# Patient Record
Sex: MALE | Race: WHITE | NOT HISPANIC OR LATINO | Employment: FULL TIME | RURAL
[De-identification: names, ages, dates, MRNs, and addresses within clinical notes are randomized per-mention and may not be internally consistent; named-entity substitution may affect disease eponyms.]

---

## 2017-11-30 RX ORDER — CITALOPRAM 40 MG/1
40 TABLET ORAL DAILY
Qty: 90 TABLET | Refills: 3 | Status: SHIPPED | OUTPATIENT
Start: 2017-11-30 | End: 2018-12-04 | Stop reason: SDUPTHER

## 2017-11-30 RX ORDER — LOSARTAN POTASSIUM 25 MG/1
25 TABLET ORAL DAILY
Qty: 90 TABLET | Refills: 3 | Status: SHIPPED | OUTPATIENT
Start: 2017-11-30 | End: 2018-10-23

## 2018-01-23 ENCOUNTER — OFFICE VISIT (OUTPATIENT)
Dept: CARDIOLOGY | Facility: CLINIC | Age: 58
End: 2018-01-23

## 2018-01-23 VITALS
SYSTOLIC BLOOD PRESSURE: 180 MMHG | HEART RATE: 78 BPM | BODY MASS INDEX: 30.79 KG/M2 | DIASTOLIC BLOOD PRESSURE: 84 MMHG | WEIGHT: 227 LBS

## 2018-01-23 DIAGNOSIS — I10 ESSENTIAL HYPERTENSION: Primary | ICD-10-CM

## 2018-01-23 DIAGNOSIS — R55 VASOVAGAL SYNCOPE: ICD-10-CM

## 2018-01-23 PROCEDURE — 99213 OFFICE O/P EST LOW 20 MIN: CPT | Performed by: INTERNAL MEDICINE

## 2018-01-23 RX ORDER — AMLODIPINE BESYLATE AND BENAZEPRIL HYDROCHLORIDE 5; 10 MG/1; MG/1
1 CAPSULE ORAL DAILY
Qty: 90 CAPSULE | Refills: 3 | Status: SHIPPED | OUTPATIENT
Start: 2018-01-23 | End: 2018-10-23 | Stop reason: SDUPTHER

## 2018-01-23 RX ORDER — VARENICLINE TARTRATE 0.5 MG/1
0.5 TABLET, FILM COATED ORAL DAILY
Qty: 90 TABLET | Refills: 1 | Status: SHIPPED | OUTPATIENT
Start: 2018-01-23 | End: 2018-10-23

## 2018-01-23 RX ORDER — VARENICLINE TARTRATE 1 MG/1
1 TABLET, FILM COATED ORAL DAILY
Qty: 60 TABLET | Refills: 0 | Status: SHIPPED | OUTPATIENT
Start: 2018-01-23 | End: 2018-10-23

## 2018-01-23 NOTE — PROGRESS NOTES
[facility] at [facility]  Office Progress Note  Chris Aguirre  1960  [phone]      Visit Date: 01/23/2018    PCP: Warren Patterson MD  [address] [place] [address]    IDENTIFICATION: A 57 y.o. male  [employer] employee    Chief Complaint   Patient presents with   • Follow-up   • Hypertension       PROBLEM LIST:   Syncope- cough  HL ? results  HTN  Nicotinism  Etoh abuse    Allergies  Allergies   Allergen Reactions   • Aspirin    • Codeine        Current Medications    Current Outpatient Prescriptions:   •  Cetirizine HCl (ZYRTEC ALLERGY PO), Take  by mouth daily., Disp: , Rfl:   •  citalopram (CeleXA) 40 MG tablet, Take 1 tablet by mouth Daily., Disp: 90 tablet, Rfl: 3  •  losartan (COZAAR) 25 MG tablet, Take 1 tablet by mouth Daily., Disp: 90 tablet, Rfl: 3  •  Mometasone Furo-Formoterol Fum (DULERA IN), Inhale 4 (four) times a day., Disp: , Rfl:   •  montelukast (SINGULAIR) 10 MG tablet, Take 10 mg by mouth every night., Disp: , Rfl:       History of Present Illness     Pt denies any new chest pain, dyspnea, dyspnea on exertion, orthopnea, PND, palpitations, lower extremity edema, or claudication.  He smokes 2ppd and drinks alcohol regularly.    ROS:  All systems have been reviewed and are negative with the exception of those mentioned in the HPI.    OBJECTIVE:  Vitals:    01/23/18 1312   BP: 180/84   BP Location: Left arm   Patient Position: Sitting   Pulse: 78   Weight: 103 kg (227 lb)     Physical Exam   Constitutional: He appears well-developed and well-nourished.   Neck: Normal range of motion. Neck supple. No hepatojugular reflux and no JVD present. Carotid bruit is not present. No tracheal deviation present. No thyromegaly present.   Cardiovascular: Normal rate, regular rhythm, S1 normal, S2 normal, intact distal pulses and normal pulses.  PMI is not displaced.  Exam reveals no gallop, no distant heart sounds, no friction rub, no midsystolic click and no  opening snap.    No murmur heard.  Pulses:       Radial pulses are 2+ on the right side, and 2+ on the left side.        Dorsalis pedis pulses are 2+ on the right side, and 2+ on the left side.        Posterior tibial pulses are 2+ on the right side, and 2+ on the left side.   Pulmonary/Chest: Effort normal and breath sounds normal. He has no wheezes. He has no rales.   Abdominal: Soft. Bowel sounds are normal. He exhibits no mass. There is no tenderness. There is no guarding.       Diagnostic Data:  Procedures      ASSESSMENT:   Diagnosis Plan   1. Essential hypertension     2. Vasovagal syncope         PLAN:  Htn uncontrolled etoh reduction, trial of lotrel 5/10. Dc cozaar    ?hl check lipids.  Given etoh use expect tgs to be elevated    Smoking cessation counseling        Warren Patterson MD, thank you for referring Mr. Aguirre for evaluation.  I have forwarded my electronically generated recommendations to you for review.  Please do not hesitate to call with any questions.      Dick Robledo MD, FACC

## 2018-02-13 ENCOUNTER — HOSPITAL ENCOUNTER (OUTPATIENT)
Age: 58
End: 2018-02-13
Payer: COMMERCIAL

## 2018-02-13 DIAGNOSIS — R07.81: Primary | ICD-10-CM

## 2018-02-13 PROCEDURE — 71101 X-RAY EXAM UNILAT RIBS/CHEST: CPT

## 2018-02-19 ENCOUNTER — TELEPHONE (OUTPATIENT)
Dept: CARDIOLOGY | Facility: CLINIC | Age: 58
End: 2018-02-19

## 2018-10-23 ENCOUNTER — OFFICE VISIT (OUTPATIENT)
Dept: CARDIOLOGY | Facility: CLINIC | Age: 58
End: 2018-10-23

## 2018-10-23 VITALS
DIASTOLIC BLOOD PRESSURE: 85 MMHG | SYSTOLIC BLOOD PRESSURE: 139 MMHG | WEIGHT: 215 LBS | HEIGHT: 72 IN | HEART RATE: 88 BPM | BODY MASS INDEX: 29.12 KG/M2

## 2018-10-23 DIAGNOSIS — E78.2 MIXED HYPERLIPIDEMIA: ICD-10-CM

## 2018-10-23 DIAGNOSIS — F17.200 SMOKER: ICD-10-CM

## 2018-10-23 DIAGNOSIS — I10 ESSENTIAL HYPERTENSION: Primary | ICD-10-CM

## 2018-10-23 PROCEDURE — 99213 OFFICE O/P EST LOW 20 MIN: CPT | Performed by: INTERNAL MEDICINE

## 2018-10-23 RX ORDER — AMLODIPINE BESYLATE AND BENAZEPRIL HYDROCHLORIDE 5; 10 MG/1; MG/1
1 CAPSULE ORAL DAILY
Qty: 90 CAPSULE | Refills: 3 | Status: SHIPPED | OUTPATIENT
Start: 2018-10-23 | End: 2019-11-12 | Stop reason: SDUPTHER

## 2018-10-23 NOTE — PROGRESS NOTES
"[facility] at [facility]  Office Progress Note  Chris Aguirre  1960  [phone]      Visit Date: 10/23/2018     PCP: Warren Patterson MD  [address] [facility] [address]    IDENTIFICATION: A 57 y.o. male  [employer] employee    Chief Complaint   Patient presents with   • Hypertension       PROBLEM LIST:   1. Syncope- cough  2. HL ? Results  1. 1/24/18  TG 59 HDL 60   3. HTN  4. Nicotinism-chantix refractory  5. Etoh abuse    Allergies  Allergies   Allergen Reactions   • Aspirin    • Codeine        Current Medications    Current Outpatient Prescriptions:   •  amLODIPine-benazepril (LOTREL) 5-10 MG per capsule, Take 1 capsule by mouth Daily., Disp: 90 capsule, Rfl: 3  •  Cetirizine HCl (ZYRTEC ALLERGY PO), Take  by mouth daily., Disp: , Rfl:   •  citalopram (CeleXA) 40 MG tablet, Take 1 tablet by mouth Daily., Disp: 90 tablet, Rfl: 3  •  Fluticasone Furoate-Vilanterol (BREO ELLIPTA) 100-25 MCG/INH aerosol powder , Inhale., Disp: , Rfl:   •  montelukast (SINGULAIR) 10 MG tablet, Take 10 mg by mouth every night., Disp: , Rfl:       History of Present Illness     Pt denies any new chest pain, dyspnea, dyspnea on exertion, orthopnea, PND, palpitations, lower extremity edema, or claudication.  He smokes 1- 2ppd and drinks alcohol regularly 6 beers nightly    ROS:  All systems have been reviewed and are negative with the exception of those mentioned in the HPI.    OBJECTIVE:  Vitals:    10/23/18 1355 10/23/18 1357   BP: 148/88 139/85   BP Location: Right arm Right arm   Patient Position: Sitting Sitting   Pulse: 88 88   Weight: 97.5 kg (215 lb)    Height: 182.9 cm (72\")      Physical Exam   Constitutional: He appears well-developed and well-nourished.   Neck: Normal range of motion. Neck supple. No hepatojugular reflux and no JVD present. Carotid bruit is not present. No tracheal deviation present. No thyromegaly present.   Cardiovascular: Normal rate, regular rhythm, " S1 normal, S2 normal, intact distal pulses and normal pulses.  PMI is not displaced.  Exam reveals no gallop, no distant heart sounds, no friction rub, no midsystolic click and no opening snap.    No murmur heard.  Pulses:       Radial pulses are 2+ on the right side, and 2+ on the left side.        Dorsalis pedis pulses are 2+ on the right side, and 2+ on the left side.        Posterior tibial pulses are 2+ on the right side, and 2+ on the left side.   Pulmonary/Chest: Effort normal and breath sounds normal. He has no wheezes. He has no rales.   Abdominal: Soft. Bowel sounds are normal. He exhibits no mass. There is no tenderness. There is no guarding.       Diagnostic Data:  Procedures      ASSESSMENT:   Diagnosis Plan   1. Essential hypertension     2. Mixed hyperlipidemia     3. Smoker         PLAN:  Htn uncontrolled etoh     ?hl check lipids.  Given etoh use expect tgs to be elevated    Smoking cessation counseling       Warren Patterson MD, thank you for referring Mr. Aguirre for evaluation.  I have forwarded my electronically generated recommendations to you for review.  Please do not hesitate to call with any questions.    . 10/23/2018  2:36 PM    Dick Robledo MD, [facility]

## 2018-11-16 ENCOUNTER — HOSPITAL ENCOUNTER (OUTPATIENT)
Age: 58
End: 2018-11-16
Payer: COMMERCIAL

## 2018-11-16 DIAGNOSIS — R59.9: Primary | ICD-10-CM

## 2018-11-16 PROCEDURE — 76536 US EXAM OF HEAD AND NECK: CPT

## 2018-12-04 RX ORDER — CITALOPRAM 40 MG/1
TABLET ORAL
Qty: 90 TABLET | Refills: 2 | Status: SHIPPED | OUTPATIENT
Start: 2018-12-04 | End: 2023-02-23

## 2019-01-07 RX ORDER — VARENICLINE TARTRATE 1 MG/1
1 TABLET, FILM COATED ORAL 2 TIMES DAILY
Qty: 60 TABLET | Refills: 2 | Status: SHIPPED | OUTPATIENT
Start: 2019-01-07 | End: 2019-11-14

## 2019-03-29 ENCOUNTER — HOSPITAL ENCOUNTER (OUTPATIENT)
Age: 59
End: 2019-03-29
Payer: COMMERCIAL

## 2019-03-29 DIAGNOSIS — S82.402A: Primary | ICD-10-CM

## 2019-03-29 PROCEDURE — 73610 X-RAY EXAM OF ANKLE: CPT

## 2019-04-19 ENCOUNTER — HOSPITAL ENCOUNTER (OUTPATIENT)
Age: 59
End: 2019-04-19
Payer: COMMERCIAL

## 2019-04-19 DIAGNOSIS — S82.402A: Primary | ICD-10-CM

## 2019-04-19 PROCEDURE — 73610 X-RAY EXAM OF ANKLE: CPT

## 2019-05-10 ENCOUNTER — HOSPITAL ENCOUNTER (OUTPATIENT)
Dept: HOSPITAL 22 - PT | Age: 59
Discharge: HOME | End: 2019-05-10
Payer: COMMERCIAL

## 2019-05-10 ENCOUNTER — HOSPITAL ENCOUNTER (OUTPATIENT)
Age: 59
End: 2019-05-10
Payer: COMMERCIAL

## 2019-05-10 DIAGNOSIS — S82.402A: Primary | ICD-10-CM

## 2019-05-10 DIAGNOSIS — S82.832A: Primary | ICD-10-CM

## 2019-05-10 PROCEDURE — 73610 X-RAY EXAM OF ANKLE: CPT

## 2019-05-10 PROCEDURE — 97760 ORTHOTIC MGMT&TRAING 1ST ENC: CPT

## 2019-06-10 ENCOUNTER — HOSPITAL ENCOUNTER (OUTPATIENT)
Age: 59
End: 2019-06-10
Payer: COMMERCIAL

## 2019-06-10 DIAGNOSIS — S82.402A: Primary | ICD-10-CM

## 2019-06-10 PROCEDURE — 73610 X-RAY EXAM OF ANKLE: CPT

## 2019-11-12 RX ORDER — AMLODIPINE BESYLATE AND BENAZEPRIL HYDROCHLORIDE 5; 10 MG/1; MG/1
CAPSULE ORAL
Qty: 90 CAPSULE | Refills: 0 | Status: SHIPPED | OUTPATIENT
Start: 2019-11-12 | End: 2020-02-19

## 2019-11-14 ENCOUNTER — OFFICE VISIT (OUTPATIENT)
Dept: CARDIOLOGY | Facility: CLINIC | Age: 59
End: 2019-11-14

## 2019-11-14 VITALS
OXYGEN SATURATION: 95 % | HEIGHT: 72 IN | WEIGHT: 215 LBS | DIASTOLIC BLOOD PRESSURE: 60 MMHG | HEART RATE: 78 BPM | SYSTOLIC BLOOD PRESSURE: 124 MMHG | BODY MASS INDEX: 29.12 KG/M2

## 2019-11-14 DIAGNOSIS — Z72.0 TOBACCO USE: ICD-10-CM

## 2019-11-14 DIAGNOSIS — E78.2 MIXED HYPERLIPIDEMIA: ICD-10-CM

## 2019-11-14 DIAGNOSIS — I10 ESSENTIAL HYPERTENSION: Primary | ICD-10-CM

## 2019-11-14 PROCEDURE — 93000 ELECTROCARDIOGRAM COMPLETE: CPT | Performed by: INTERNAL MEDICINE

## 2019-11-14 PROCEDURE — 99213 OFFICE O/P EST LOW 20 MIN: CPT | Performed by: INTERNAL MEDICINE

## 2019-11-14 PROCEDURE — 99406 BEHAV CHNG SMOKING 3-10 MIN: CPT | Performed by: INTERNAL MEDICINE

## 2019-11-14 NOTE — PROGRESS NOTES
"[facility] at [facility]  Office Progress Note  Chris Aguirre  1960      Visit Date: 11/14/19    PCP: Warren Patterson MD  [address]   [facility] [address]    IDENTIFICATION: A 58 y.o. male [employer] employee      PROBLEM LIST:   1. Syncope- cough  2. 2011 stress test per Dr. Hickman, wnl, idb  3. HL? Results  1. 1/24/18  TG 59 HDL 60   4. HTN  5. Nicotinism-chantix refractory  6. Etoh abuse    Chief Complaint   Patient presents with   • Hypertension       Allergies  Allergies   Allergen Reactions   • Aspirin    • Codeine        Current Medications    Current Outpatient Medications:   •  amLODIPine-benazepril (LOTREL 5-10) 5-10 MG per capsule, TAKE 1 CAPSULE DAILY, Disp: 90 capsule, Rfl: 0  •  Cetirizine HCl (ZYRTEC ALLERGY PO), Take  by mouth daily., Disp: , Rfl:   •  citalopram (CeleXA) 40 MG tablet, TAKE 1 TABLET BY MOUTH ONCE DAILY., Disp: 90 tablet, Rfl: 2  •  Fluticasone Furoate-Vilanterol (BREO ELLIPTA) 100-25 MCG/INH aerosol powder , Inhale., Disp: , Rfl:   •  montelukast (SINGULAIR) 10 MG tablet, Take 10 mg by mouth every night., Disp: , Rfl:       History of Present Illness   Chris Aguirre is a 58 y.o. year old male here for follow up.  Pt is still smoking 1.5 ppd cigarettes. Drinks 6 pack a day of beer. Not very active. He states he walks in the parking lot at work, but does not get any \"on purpose\" exercise. Does not check BP at home. Has not had labs in 2 years.  Pt denies any chest pain, dyspnea, dyspnea on exertion, orthopnea, PND, palpitations, lower extremity edema, or claudication.      OBJECTIVE:  Vitals:    11/14/19 1334   BP: 124/60   BP Location: Right arm   Patient Position: Sitting   Pulse: 78   SpO2: 95%   Weight: 97.5 kg (215 lb)   Height: 182.9 cm (72\")     Physical Exam   Constitutional: He is oriented to person, place, and time. He appears well-developed and well-nourished. No distress.   Smells of smoke   Cardiovascular: " Normal rate, regular rhythm, normal heart sounds and intact distal pulses.   No murmur heard.  Pulses:       Radial pulses are 2+ on the right side, and 2+ on the left side.        Dorsalis pedis pulses are 2+ on the right side, and 2+ on the left side.        Posterior tibial pulses are 2+ on the right side, and 2+ on the left side.   Pulmonary/Chest: Effort normal and breath sounds normal. He has no wheezes. He has no rales.   Abdominal: Soft. Bowel sounds are normal. There is no tenderness. There is no guarding.   Musculoskeletal: He exhibits no edema or tenderness.   Neurological: He is alert and oriented to person, place, and time.   Skin: Skin is warm and dry. No rash noted.   Psychiatric: He has a normal mood and affect.   Nursing note and vitals reviewed.      Diagnostic Data:    ECG 12 Lead  Date/Time: 11/14/2019 1:56 PM  Performed by: Dick Robledo MD  Authorized by: Dick Robledo MD   Comparison: not compared with previous ECG   Previous ECG: no previous ECG available  Rhythm: sinus rhythm  BPM: 67  Other findings: low voltage    Clinical impression: non-specific ECG              ASSESSMENT:   Diagnosis Plan   1. Essential hypertension     2. Mixed hyperlipidemia     3. Tobacco use         PLAN:  1. Patient has acceptable BP. Continue current medical management. Counseled to regularly check BP at home with goal averaging <130/80.   2. Will recheck labs today. Likely will need statin.  3. The patient has been counseled for 3-5 minutes on the many health detriments of cigarette smoking, methods to cessation, and benefits of cessation.      Warren Patterson MD, thank you for referring Mr. Aguirre for evaluation.  I have forwarded my electronically generated recommendations to you for review.  Please do not hesitate to call with any questions.    Scribed for Dick Robledo MD by Leeanne Ibrahim PA-C. 11/14/2019  1:57 PM   IDick MD, personally performed the services described in  this documentation as scribed by the above named individual in my presence, and it is both accurate and complete.  11/14/2019  2:08 PM    Dick Robledo MD, FACC

## 2020-02-19 RX ORDER — AMLODIPINE BESYLATE AND BENAZEPRIL HYDROCHLORIDE 5; 10 MG/1; MG/1
CAPSULE ORAL
Qty: 90 CAPSULE | Refills: 2 | Status: SHIPPED | OUTPATIENT
Start: 2020-02-19 | End: 2020-11-20

## 2020-06-22 NOTE — TELEPHONE ENCOUNTER
Called and left  labs looked good no changes at this time we will see him at next f/u appt.    Patient armband removed and shredded  . MyChart Activation    Thank you for requesting access to [employer]. Please follow the instructions below to securely access and download your online medical record. [employer]able allows you to send messages to your doctor, view your test results, renew your prescriptions, schedule appointments, and more. How Do I Sign Up? 1. In your internet browser, go to [email or web address]  2. Click on the First Time User? Click Here link in the Sign In box. You will be redirect to the New Member Sign Up page. 3. Enter your Smallable Access Code exactly as it appears below. You will not need to use this code after youve completed the sign-up process. If you do not sign up before the expiration date, you must request a new code. Smallable Access Code: [email or web address]  Expires: 2020  6:40 PM (This is the date your Smallable access code will )    4. Enter the last four digits of your Social Security Number (xxxx) and Date of Birth (mm/dd/yyyy) as indicated and click Submit. You will be taken to the next sign-up page. 5. Create a Smallable ID. This will be your Smallable login ID and cannot be changed, so think of one that is secure and easy to remember. 6. Create a Smallable password. You can change your password at any time. 7. Enter your Password Reset Question and Answer. This can be used at a later time if you forget your password. 8. Enter your e-mail address. You will receive e-mail notification when new information is available in [address]. 9. Click Sign Up. You can now view and download portions of your medical record. 10. Click the Download Summary menu link to download a portable copy of your medical information. Additional Information    If you have questions, please visit the Frequently Asked Questions section of the Smallable website at [email or web address] Remember, [employer]able is NOT to be used for urgent needs.  For medical emergencies, dial 911.      I have reviewed discharge instructions with the caregiver. The caregiver verbalized understanding. DISCHARGE SUMMARY from Nurse    PATIENT INSTRUCTIONS:    After general anesthesia or intravenous sedation, for 24 hours or while taking prescription Narcotics:  · Limit your activities  · Do not drive and operate hazardous machinery  · Do not make important personal or business decisions  · Do  not drink alcoholic beverages  · If you have not urinated within 8 hours after discharge, please contact your surgeon on call. Report the following to your surgeon:  · Excessive pain, swelling, redness or odor of or around the surgical area  · Temperature over 100.5  · Nausea and vomiting lasting longer than 4 hours or if unable to take medications  · Any signs of decreased circulation or nerve impairment to extremity: change in color, persistent  numbness, tingling, coldness or increase pain  · Any questions    What to do at Home:  Recommended activity: Activity as tolerated, ***    If you experience any of the following symptoms weakness, dizziness, shortness of breath,facial droop please follow up with Emergency Department or Provider ***. *  Please give a list of your current medications to your Primary Care Provider. *  Please update this list whenever your medications are discontinued, doses are      changed, or new medications (including over-the-counter products) are added. *  Please carry medication information at all times in case of emergency situations. These are general instructions for a healthy lifestyle:    No smoking/ No tobacco products/ Avoid exposure to second hand smoke  Surgeon General's Warning:  Quitting smoking now greatly reduces serious risk to your health.     Obesity, smoking, and sedentary lifestyle greatly increases your risk for illness    A healthy diet, regular physical exercise & weight monitoring are important for maintaining a healthy lifestyle    You may be retaining fluid if you have a history of heart failure or if you experience any of the following symptoms:  Weight gain of 3 pounds or more overnight or 5 pounds in a week, increased swelling in our hands or feet or shortness of breath while lying flat in bed. Please call your doctor as soon as you notice any of these symptoms; do not wait until your next office visit. The discharge information has been reviewed with the caregiver. The patient and caregiver verbalized understanding. Discharge medications reviewed with the caregiver and appropriate educational materials and side effects teaching were provided.   ___________________________________________________________________________________________________________________________________

## 2020-11-20 RX ORDER — AMLODIPINE BESYLATE AND BENAZEPRIL HYDROCHLORIDE 5; 10 MG/1; MG/1
CAPSULE ORAL
Qty: 90 CAPSULE | Refills: 3 | Status: SHIPPED | OUTPATIENT
Start: 2020-11-20 | End: 2021-11-23

## 2020-12-10 ENCOUNTER — OFFICE VISIT (OUTPATIENT)
Dept: CARDIOLOGY | Facility: CLINIC | Age: 60
End: 2020-12-10

## 2020-12-10 VITALS
DIASTOLIC BLOOD PRESSURE: 70 MMHG | BODY MASS INDEX: 28.85 KG/M2 | OXYGEN SATURATION: 93 % | HEIGHT: 72 IN | SYSTOLIC BLOOD PRESSURE: 130 MMHG | WEIGHT: 213 LBS | HEART RATE: 86 BPM

## 2020-12-10 DIAGNOSIS — E78.2 MIXED HYPERLIPIDEMIA: ICD-10-CM

## 2020-12-10 DIAGNOSIS — I10 ESSENTIAL HYPERTENSION: Primary | ICD-10-CM

## 2020-12-10 PROCEDURE — 93000 ELECTROCARDIOGRAM COMPLETE: CPT | Performed by: INTERNAL MEDICINE

## 2020-12-10 PROCEDURE — 99214 OFFICE O/P EST MOD 30 MIN: CPT | Performed by: INTERNAL MEDICINE

## 2020-12-10 NOTE — PROGRESS NOTES
"[facility] at [facility]  Office Progress Note  Chris Aguirre  1960      Visit Date: 12/10/20    PCP: Warren Patterson MD  [address]   [facility] [address]    IDENTIFICATION: A 60 y.o. male [employer] employee      PROBLEM LIST:   1. Syncope- cough  2. 2011 stress test per Dr. Hickman, wnl, idb  3. HL  1. 1/24/18  TG 59 HDL 60   4. HTN  5. Nicotinism-chantix refractory  6. Etoh abuse    Chief Complaint   Patient presents with   • Hypertension       Allergies  Allergies   Allergen Reactions   • Aspirin    • Codeine        Current Medications    Current Outpatient Medications:   •  amLODIPine-benazepril (LOTREL 5-10) 5-10 MG per capsule, TAKE 1 CAPSULE DAILY, Disp: 90 capsule, Rfl: 3  •  Cetirizine HCl (ZYRTEC ALLERGY PO), Take  by mouth daily., Disp: , Rfl:   •  citalopram (CeleXA) 40 MG tablet, TAKE 1 TABLET BY MOUTH ONCE DAILY., Disp: 90 tablet, Rfl: 2  •  Fluticasone Furoate-Vilanterol (BREO ELLIPTA) 100-25 MCG/INH aerosol powder , Inhale., Disp: , Rfl:   •  montelukast (SINGULAIR) 10 MG tablet, Take 10 mg by mouth every night., Disp: , Rfl:       History of Present Illness   Chris Aguirre is a 60 y.o. year old male here for follow up.  Continues to smoke but lower yield down to 1 pack of cigarettes daily.  He states he is attempting to stop smoking for his 8-year-old grandson    OBJECTIVE:  Vitals:    12/10/20 1129   BP: 130/70   BP Location: Right arm   Patient Position: Sitting   Pulse: 86   SpO2: 93%   Weight: 96.6 kg (213 lb)   Height: 182.9 cm (72\")     Physical Exam   Constitutional: He is oriented to person, place, and time. He appears well-developed and well-nourished. No distress.   Smells of smoke   Cardiovascular: Normal rate, regular rhythm, normal heart sounds and intact distal pulses.   No murmur heard.  Pulses:       Radial pulses are 2+ on the right side and 2+ on the left side.        Dorsalis pedis pulses are 2+ on the right side and 2+ " on the left side.        Posterior tibial pulses are 2+ on the right side and 2+ on the left side.   Pulmonary/Chest: Effort normal and breath sounds normal. He has no wheezes. He has no rales.   Abdominal: Soft. Bowel sounds are normal. There is no abdominal tenderness. There is no guarding.   Musculoskeletal:         General: No tenderness or edema.   Neurological: He is alert and oriented to person, place, and time.   Skin: Skin is warm and dry. No rash noted.   Psychiatric: He has a normal mood and affect.   Nursing note and vitals reviewed.      Diagnostic Data:    ECG 12 Lead    Date/Time: 12/10/2020 11:58 AM  Performed by: Dick Robledo MD  Authorized by: Dick Robledo MD   Previous ECG: no previous ECG available  BPM: 68    Clinical impression: non-specific ECG              ASSESSMENT:   Diagnosis Plan   1. Essential hypertension     2. Mixed hyperlipidemia         PLAN:  1. Patient has acceptable BP. Continue current medical management. Counseled to regularly check BP at home with goal averaging <130/80.   2. Will again attempt to recheck labs today. Likely will need statin.  3. The patient has been counseled for 3-5 minutes on the many health detriments of cigarette smoking, methods to cessation, and benefits of cessation.      Warren Patterson MD, thank you for referring Mr. Aguirre for evaluation.  I have forwarded my electronically generated recommendations to you for review.  Please do not hesitate to call with any questions.     12/10/2020  11:57 EST    Dick Robledo MD, [facility]

## 2021-07-20 ENCOUNTER — HOSPITAL ENCOUNTER (EMERGENCY)
Dept: HOSPITAL 22 - UTC | Age: 61
Discharge: HOME | End: 2021-07-20
Payer: COMMERCIAL

## 2021-07-20 VITALS — BODY MASS INDEX: 27.8 KG/M2

## 2021-07-20 VITALS
SYSTOLIC BLOOD PRESSURE: 126 MMHG | OXYGEN SATURATION: 98 % | HEART RATE: 74 BPM | RESPIRATION RATE: 20 BRPM | DIASTOLIC BLOOD PRESSURE: 56 MMHG

## 2021-07-20 VITALS
OXYGEN SATURATION: 98 % | RESPIRATION RATE: 16 BRPM | HEART RATE: 82 BPM | DIASTOLIC BLOOD PRESSURE: 84 MMHG | SYSTOLIC BLOOD PRESSURE: 146 MMHG

## 2021-07-20 VITALS
TEMPERATURE: 98.24 F | HEART RATE: 87 BPM | RESPIRATION RATE: 18 BRPM | SYSTOLIC BLOOD PRESSURE: 181 MMHG | DIASTOLIC BLOOD PRESSURE: 91 MMHG

## 2021-07-20 VITALS
DIASTOLIC BLOOD PRESSURE: 82 MMHG | RESPIRATION RATE: 19 BRPM | TEMPERATURE: 98.3 F | OXYGEN SATURATION: 98 % | HEART RATE: 91 BPM | SYSTOLIC BLOOD PRESSURE: 135 MMHG

## 2021-07-20 DIAGNOSIS — F17.210: ICD-10-CM

## 2021-07-20 DIAGNOSIS — I10: ICD-10-CM

## 2021-07-20 DIAGNOSIS — F41.8: ICD-10-CM

## 2021-07-20 DIAGNOSIS — K61.1: Primary | ICD-10-CM

## 2021-07-20 LAB
ANION GAP SERPL CALC-SCNC: 13.9 MEQ/L (ref 5–15)
ANION GAP SERPL CALC-SCNC: 20.9 MEQ/L (ref 5–15)
BUN SERPL-MCNC: 10 MG/DL (ref 9–20)
BUN SERPL-MCNC: 9 MG/DL (ref 9–20)
CALCIUM SPEC-MCNC: 9.1 MG/DL (ref 8.4–10.2)
CALCIUM SPEC-MCNC: 9.3 MG/DL (ref 8.4–10.2)
CELLS COUNTED: 100
CHLORIDE SPEC-SCNC: 100 MMOL/L (ref 98–107)
CHLORIDE SPEC-SCNC: 99 MMOL/L (ref 98–107)
CO2 SERPL-SCNC: 23 MMOL/L (ref 22–30)
CO2 SERPL-SCNC: 26 MMOL/L (ref 22–30)
CREAT BLD-SCNC: 0.6 MG/DL (ref 0.66–1.25)
CREAT BLD-SCNC: 0.7 MG/DL (ref 0.66–1.25)
CREATININE CLEARANCE ESTIMATED: 149 ML/MIN (ref 50–200)
CREATININE CLEARANCE ESTIMATED: 174 ML/MIN (ref 50–200)
ESTIMATED GLOMERULAR FILT RATE: 115 ML/MIN (ref 60–?)
ESTIMATED GLOMERULAR FILT RATE: 138 ML/MIN (ref 60–?)
GFR (AFRICAN AMERICAN): 140 ML/MIN (ref 60–?)
GFR (AFRICAN AMERICAN): 167 ML/MIN (ref 60–?)
GLUCOSE: 113 MG/DL (ref 74–100)
GLUCOSE: 115 MG/DL (ref 74–100)
HCT VFR BLD CALC: 44.9 % (ref 42–52)
HGB BLD-MCNC: 15.9 G/DL (ref 14.1–18)
HYPOCHROMIA BLD QL: (no result)
MACROCYTES BLD QL: (no result)
MANUAL DIFFERENTIAL: (no result)
MCHC RBC-ENTMCNC: 35.4 G/DL (ref 31.8–35.4)
MCV RBC: 95.9 FL (ref 80–94)
MEAN CORPUSCULAR HEMOGLOBIN: 34 PG (ref 27–31.2)
PLATELET # BLD: 282 K/MM3 (ref 142–424)
POTASSIUM: 10.9 MMOL/L (ref 3.5–5.1)
POTASSIUM: 3.9 MMOL/L (ref 3.5–5.1)
RBC # BLD AUTO: 4.68 M/MM3 (ref 4.6–6.2)
SODIUM SPEC-SCNC: 132 MMOL/L (ref 136–145)
SODIUM SPEC-SCNC: 136 MMOL/L (ref 136–145)
WBC # BLD AUTO: 18.2 K/MM3 (ref 4.8–10.8)

## 2021-07-20 PROCEDURE — 85025 COMPLETE CBC W/AUTO DIFF WBC: CPT

## 2021-07-20 PROCEDURE — 96365 THER/PROPH/DIAG IV INF INIT: CPT

## 2021-07-20 PROCEDURE — 99284 EMERGENCY DEPT VISIT MOD MDM: CPT

## 2021-07-20 PROCEDURE — 46050 I&D PERIANAL ABSCESS SUPFC: CPT

## 2021-07-20 PROCEDURE — 74177 CT ABD & PELVIS W/CONTRAST: CPT

## 2021-07-20 PROCEDURE — 36415 COLL VENOUS BLD VENIPUNCTURE: CPT

## 2021-07-20 PROCEDURE — 80048 BASIC METABOLIC PNL TOTAL CA: CPT

## 2021-07-20 PROCEDURE — 85007 BL SMEAR W/DIFF WBC COUNT: CPT

## 2021-07-20 NOTE — PC.NURSE
Additional blood drawn to rerun Potassium d/t initial critical result. Second specimen collected was reported by lab to be hemolyzed. Lab to redraw next specimen once pt finishes with CT.

## 2021-07-20 NOTE — PC.NURSE
PATIENT SENT TO ER PER DONN BARNETT FOR FURTHER EVALUATION. REPORT GIVEN BY DONN BARNETT TO SHANE PARSONS RN

## 2021-10-12 ENCOUNTER — HOSPITAL ENCOUNTER (OUTPATIENT)
Age: 61
End: 2021-10-12
Payer: COMMERCIAL

## 2021-10-12 DIAGNOSIS — Z12.11: ICD-10-CM

## 2021-10-12 DIAGNOSIS — Z01.812: Primary | ICD-10-CM

## 2021-10-12 DIAGNOSIS — Z11.52: ICD-10-CM

## 2021-10-12 PROCEDURE — U0005 INFEC AGEN DETEC AMPLI PROBE: HCPCS

## 2021-10-12 PROCEDURE — C9803 HOPD COVID-19 SPEC COLLECT: HCPCS

## 2021-10-12 PROCEDURE — U0003 INFECTIOUS AGENT DETECTION BY NUCLEIC ACID (DNA OR RNA); SEVERE ACUTE RESPIRATORY SYNDROME CORONAVIRUS 2 (SARS-COV-2) (CORONAVIRUS DISEASE [COVID-19]), AMPLIFIED PROBE TECHNIQUE, MAKING USE OF HIGH THROUGHPUT TECHNOLOGIES AS DESCRIBED BY CMS-2020-01-R: HCPCS

## 2021-10-14 ENCOUNTER — HOSPITAL ENCOUNTER (OUTPATIENT)
Dept: HOSPITAL 22 - OUTP | Age: 61
Discharge: HOME | End: 2021-10-14
Payer: COMMERCIAL

## 2021-10-14 VITALS
OXYGEN SATURATION: 99 % | RESPIRATION RATE: 18 BRPM | SYSTOLIC BLOOD PRESSURE: 131 MMHG | DIASTOLIC BLOOD PRESSURE: 72 MMHG | HEART RATE: 51 BPM

## 2021-10-14 VITALS
OXYGEN SATURATION: 97 % | RESPIRATION RATE: 18 BRPM | DIASTOLIC BLOOD PRESSURE: 74 MMHG | HEART RATE: 86 BPM | TEMPERATURE: 97.2 F | SYSTOLIC BLOOD PRESSURE: 135 MMHG

## 2021-10-14 VITALS
HEART RATE: 69 BPM | OXYGEN SATURATION: 97 % | RESPIRATION RATE: 18 BRPM | SYSTOLIC BLOOD PRESSURE: 121 MMHG | DIASTOLIC BLOOD PRESSURE: 69 MMHG | TEMPERATURE: 97.34 F

## 2021-10-14 VITALS
OXYGEN SATURATION: 98 % | RESPIRATION RATE: 18 BRPM | DIASTOLIC BLOOD PRESSURE: 73 MMHG | SYSTOLIC BLOOD PRESSURE: 125 MMHG | HEART RATE: 56 BPM

## 2021-10-14 VITALS
SYSTOLIC BLOOD PRESSURE: 135 MMHG | HEART RATE: 52 BPM | RESPIRATION RATE: 18 BRPM | OXYGEN SATURATION: 98 % | DIASTOLIC BLOOD PRESSURE: 72 MMHG

## 2021-10-14 VITALS — BODY MASS INDEX: 27.8 KG/M2

## 2021-10-14 VITALS — OXYGEN SATURATION: 97 %

## 2021-10-14 DIAGNOSIS — F32.9: ICD-10-CM

## 2021-10-14 DIAGNOSIS — Z72.0: ICD-10-CM

## 2021-10-14 DIAGNOSIS — K63.5: ICD-10-CM

## 2021-10-14 DIAGNOSIS — Z12.11: Primary | ICD-10-CM

## 2021-10-14 DIAGNOSIS — F41.9: ICD-10-CM

## 2021-10-14 DIAGNOSIS — K62.89: ICD-10-CM

## 2021-10-14 DIAGNOSIS — Z86.010: ICD-10-CM

## 2021-10-14 DIAGNOSIS — Z88.6: ICD-10-CM

## 2021-10-14 DIAGNOSIS — J44.9: ICD-10-CM

## 2021-10-14 DIAGNOSIS — K57.30: ICD-10-CM

## 2021-10-14 DIAGNOSIS — K64.0: ICD-10-CM

## 2021-10-14 DIAGNOSIS — I10: ICD-10-CM

## 2021-10-14 PROCEDURE — 0DJD8ZZ INSPECTION OF LOWER INTESTINAL TRACT, VIA NATURAL OR ARTIFICIAL OPENING ENDOSCOPIC: ICD-10-PCS | Performed by: SURGERY

## 2021-10-14 PROCEDURE — 45380 COLONOSCOPY AND BIOPSY: CPT

## 2021-10-14 PROCEDURE — 45385 COLONOSCOPY W/LESION REMOVAL: CPT

## 2021-11-23 RX ORDER — AMLODIPINE BESYLATE AND BENAZEPRIL HYDROCHLORIDE 5; 10 MG/1; MG/1
CAPSULE ORAL
Qty: 90 CAPSULE | Refills: 0 | Status: SHIPPED | OUTPATIENT
Start: 2021-11-23 | End: 2022-02-10 | Stop reason: SDUPTHER

## 2022-02-09 NOTE — PROGRESS NOTES
"[facility]  Office Progress Note  Chris Aguirre  1960 [address] [place] [address]       Visit Date: 02/10/22    PCP: Warren Patterson MD  [address] [place] [address]    IDENTIFICATION: A 61 y.o. male [employer] employee    PROBLEM LIST:   1. Syncope- cough  2. 2011 stress test per Dr. Hickman, wnl, idb  3. HL  1. 1/24/18  TG 59 HDL 60   4. HTN  5. Nicotinism-chantix refractory  6. Etoh abuse      CC:   Chief Complaint   Patient presents with   • Essential hypertension       Allergies  Allergies   Allergen Reactions   • Aspirin    • Codeine        Current Medications    Current Outpatient Medications:   •  amLODIPine-benazepril (LOTREL 5-10) 5-10 MG per capsule, TAKE 1 CAPSULE BY MOUTH DAILY., Disp: 90 capsule, Rfl: 0  •  Azelastine-Fluticasone 137-50 MCG/ACT suspension, INSTILL 1 SPRAY IN BOTH NOSTRILS TWICE A DAY, Disp: , Rfl:   •  Cetirizine HCl (ZYRTEC ALLERGY PO), Take  by mouth daily., Disp: , Rfl:   •  citalopram (CeleXA) 40 MG tablet, TAKE 1 TABLET BY MOUTH ONCE DAILY., Disp: 90 tablet, Rfl: 2  •  Fluticasone Furoate-Vilanterol (BREO ELLIPTA) 100-25 MCG/INH aerosol powder , Inhale., Disp: , Rfl:   •  montelukast (SINGULAIR) 10 MG tablet, Take 10 mg by mouth every night., Disp: , Rfl:       History of Present Illness   Chris Aguirre is a 61 y.o. year old male here for follow up.    Pt denies any chest pain, dyspnea, dyspnea on exertion, orthopnea, PND, palpitations, lower extremity edema, or claudication.  Smoking down to 3/4 pack a day.  States that his grandson is continuing to be on him about cessation.  Notable family members in his home with Covid but he did not test positive      OBJECTIVE:  Vitals:    02/10/22 1425   BP: 128/70   BP Location: Right arm   Patient Position: Sitting   Pulse: 84   SpO2: 94%   Weight: 97.1 kg (214 lb)   Height: 182.9 cm (72\")     Body mass index is 29.02 kg/m².    Constitutional:       " Appearance: Healthy appearance. Not in distress.   Neck:      Vascular: No JVR. JVD normal.   Pulmonary:      Effort: Pulmonary effort is normal.      Breath sounds: Normal breath sounds. No wheezing. No rhonchi. No rales.   Chest:      Chest wall: Not tender to palpatation.   Cardiovascular:      PMI at left midclavicular line. Normal rate. Regular rhythm. Normal S1. Normal S2.      Murmurs: There is no murmur.      No gallop. No click. No rub.   Pulses:     Intact distal pulses.   Edema:     Peripheral edema absent.   Abdominal:      General: Bowel sounds are normal.      Palpations: Abdomen is soft.      Tenderness: There is no abdominal tenderness.   Musculoskeletal: Normal range of motion.         General: No tenderness. Skin:     General: Skin is warm and dry.   Neurological:      General: No focal deficit present.      Mental Status: Alert and oriented to person, place and time.         Diagnostic Data:  Procedures      ASSESSMENT:   Diagnosis Plan   1. Primary hypertension     2. Mixed hyperlipidemia     3. Smoker         PLAN:  Hypertension controlled Lotrel refill    Mixed dyslipidemia we will document cardiac calcium score and low-dose CT chest    Okay cessation counseling greater than 10 minutes the office today and recommend cancer screening CT chest          Dick Robledo MD, FACC

## 2022-02-10 ENCOUNTER — OFFICE VISIT (OUTPATIENT)
Dept: CARDIOLOGY | Facility: CLINIC | Age: 62
End: 2022-02-10

## 2022-02-10 VITALS
BODY MASS INDEX: 28.99 KG/M2 | HEIGHT: 72 IN | SYSTOLIC BLOOD PRESSURE: 128 MMHG | HEART RATE: 84 BPM | OXYGEN SATURATION: 94 % | DIASTOLIC BLOOD PRESSURE: 70 MMHG | WEIGHT: 214 LBS

## 2022-02-10 DIAGNOSIS — E78.2 MIXED HYPERLIPIDEMIA: ICD-10-CM

## 2022-02-10 DIAGNOSIS — F17.200 SMOKER: ICD-10-CM

## 2022-02-10 DIAGNOSIS — I10 PRIMARY HYPERTENSION: Primary | ICD-10-CM

## 2022-02-10 PROCEDURE — 99214 OFFICE O/P EST MOD 30 MIN: CPT | Performed by: INTERNAL MEDICINE

## 2022-02-10 RX ORDER — AMLODIPINE BESYLATE AND BENAZEPRIL HYDROCHLORIDE 5; 10 MG/1; MG/1
1 CAPSULE ORAL DAILY
Qty: 90 CAPSULE | Refills: 0 | Status: SHIPPED | OUTPATIENT
Start: 2022-02-10 | End: 2022-05-24

## 2022-02-10 RX ORDER — AZELASTINE HYDROCHLORIDE, FLUTICASONE PROPIONATE 137; 50 UG/1; UG/1
SPRAY, METERED NASAL
COMMUNITY
Start: 2022-01-26

## 2022-02-24 ENCOUNTER — HOSPITAL ENCOUNTER (OUTPATIENT)
Dept: CT IMAGING | Facility: HOSPITAL | Age: 62
Discharge: HOME | End: 2022-02-24
Admitting: INTERNAL MEDICINE

## 2022-02-24 DIAGNOSIS — F17.200 SMOKER: ICD-10-CM

## 2022-02-24 PROCEDURE — 71271 CT THORAX LUNG CANCER SCR C-: CPT

## 2022-03-01 ENCOUNTER — TELEPHONE (OUTPATIENT)
Dept: CARDIOLOGY | Facility: CLINIC | Age: 62
End: 2022-03-01

## 2022-03-01 DIAGNOSIS — E78.2 MIXED HYPERLIPIDEMIA: Primary | ICD-10-CM

## 2022-03-01 RX ORDER — ROSUVASTATIN CALCIUM 20 MG/1
20 TABLET, COATED ORAL DAILY
Qty: 30 TABLET | Refills: 11 | Status: SHIPPED | OUTPATIENT
Start: 2022-03-01 | End: 2023-02-20 | Stop reason: SDUPTHER

## 2022-03-01 NOTE — TELEPHONE ENCOUNTER
----- Message from Dick Robledo MD sent at 2/28/2022  2:10 PM EST -----  Ct chest -  no lung Ca  Severe cor calcium- start crestor 20      Pt given results and advised to FU closely with PCP regarding lung disease. He is agreeable to Crestor 20, will mail orders to have labs checked in 6-8 weeks. Letter sent to PCP and pt.

## 2022-04-22 RX ORDER — CITALOPRAM 20 MG/1
TABLET ORAL
Qty: 30 TABLET | Refills: 0 | Status: SHIPPED | OUTPATIENT
Start: 2022-04-22 | End: 2022-06-28

## 2022-05-24 RX ORDER — AMLODIPINE BESYLATE AND BENAZEPRIL HYDROCHLORIDE 5; 10 MG/1; MG/1
1 CAPSULE ORAL DAILY
Qty: 90 CAPSULE | Refills: 0 | Status: SHIPPED | OUTPATIENT
Start: 2022-05-24 | End: 2022-11-21

## 2022-06-28 ENCOUNTER — TELEPHONE (OUTPATIENT)
Dept: FAMILY MEDICINE CLINIC | Facility: CLINIC | Age: 62
End: 2022-06-28

## 2022-06-28 RX ORDER — CITALOPRAM 20 MG/1
TABLET ORAL
Qty: 10 TABLET | Refills: 0 | Status: SHIPPED | OUTPATIENT
Start: 2022-06-28

## 2022-06-28 NOTE — TELEPHONE ENCOUNTER
Please call, does he have any celexa left? Looks like he has not been seen officially in this office yet. Dr Scott back next week. Does he have enough til then? I can give enough til Dr Scott is back if needed. Let me know.

## 2022-11-19 DIAGNOSIS — E78.2 MIXED HYPERLIPIDEMIA: Primary | ICD-10-CM

## 2022-11-19 DIAGNOSIS — I10 PRIMARY HYPERTENSION: ICD-10-CM

## 2022-11-21 RX ORDER — AMLODIPINE BESYLATE AND BENAZEPRIL HYDROCHLORIDE 5; 10 MG/1; MG/1
1 CAPSULE ORAL DAILY
Qty: 90 CAPSULE | Refills: 0 | Status: SHIPPED | OUTPATIENT
Start: 2022-11-21 | End: 2023-02-20 | Stop reason: SDUPTHER

## 2022-11-21 NOTE — TELEPHONE ENCOUNTER
Need labs at next appt for further refills  Spoke with pt and he has not had labs in the last year. Will mail lab orders

## 2023-02-20 DIAGNOSIS — E78.2 MIXED HYPERLIPIDEMIA: ICD-10-CM

## 2023-02-20 RX ORDER — AMLODIPINE BESYLATE AND BENAZEPRIL HYDROCHLORIDE 5; 10 MG/1; MG/1
1 CAPSULE ORAL DAILY
Qty: 90 CAPSULE | Refills: 0 | Status: SHIPPED | OUTPATIENT
Start: 2023-02-20

## 2023-02-20 RX ORDER — ROSUVASTATIN CALCIUM 20 MG/1
20 TABLET, COATED ORAL DAILY
Qty: 90 TABLET | Refills: 0 | Status: SHIPPED | OUTPATIENT
Start: 2023-02-20 | End: 2023-02-23 | Stop reason: SDUPTHER

## 2023-02-23 ENCOUNTER — OFFICE VISIT (OUTPATIENT)
Dept: CARDIOLOGY | Facility: CLINIC | Age: 63
End: 2023-02-23
Payer: COMMERCIAL

## 2023-02-23 VITALS
HEIGHT: 72 IN | DIASTOLIC BLOOD PRESSURE: 68 MMHG | SYSTOLIC BLOOD PRESSURE: 130 MMHG | WEIGHT: 208 LBS | HEART RATE: 83 BPM | BODY MASS INDEX: 28.17 KG/M2 | OXYGEN SATURATION: 97 %

## 2023-02-23 DIAGNOSIS — E78.2 MIXED HYPERLIPIDEMIA: ICD-10-CM

## 2023-02-23 PROCEDURE — 99214 OFFICE O/P EST MOD 30 MIN: CPT | Performed by: INTERNAL MEDICINE

## 2023-02-23 RX ORDER — ROSUVASTATIN CALCIUM 20 MG/1
20 TABLET, COATED ORAL DAILY
Qty: 90 TABLET | Refills: 0 | Status: SHIPPED | OUTPATIENT
Start: 2023-02-23 | End: 2023-02-27 | Stop reason: SDUPTHER

## 2023-02-23 RX ORDER — VARENICLINE TARTRATE 0.5 MG/1
TABLET, FILM COATED ORAL
COMMUNITY
Start: 2023-02-21

## 2023-02-23 NOTE — PROGRESS NOTES
[facility] Cardiology  Office Progress Note  Chris Aguirre  1960 [address] [facility] [place] [address]       Visit Date: 02/23/23    PCP: Siva Darling MD  [address]   [facility] [place] [address]    IDENTIFICATION: A 62 y.o. male Formerly [employer] employee    PROBLEM LIST:   1. Syncope- cough  2. CAD  2011 stress test per Dr. Hickman, wnl, idb  2022 CT chest severe cor calcifications  3. HL  1. 1/24/18  TG 59 HDL 60   4. HTN  5. Nicotinism-chantix refractory  2/22 CT chest severe emphysema 5mm noncalcified nodule  6. Etoh abuse      CC:   Chief Complaint   Patient presents with   • Primary hypertension       Allergies  Allergies   Allergen Reactions   • Aspirin    • Codeine        Current Medications    Current Outpatient Medications:   •  amLODIPine-benazepril (LOTREL 5-10) 5-10 MG per capsule, Take 1 capsule by mouth Daily., Disp: 90 capsule, Rfl: 0  •  Azelastine-Fluticasone 137-50 MCG/ACT suspension, INSTILL 1 SPRAY IN BOTH NOSTRILS TWICE A DAY, Disp: , Rfl:   •  Cetirizine HCl (ZYRTEC ALLERGY PO), Take  by mouth daily., Disp: , Rfl:   •  citalopram (CeleXA) 20 MG tablet, TAKE 1 TABLET BY MOUTH ONCE DAILY., Disp: 10 tablet, Rfl: 0  •  Fluticasone Furoate-Vilanterol (BREO ELLIPTA) 100-25 MCG/INH inhaler, Inhale., Disp: , Rfl:   •  montelukast (SINGULAIR) 10 MG tablet, Take 10 mg by mouth every night., Disp: , Rfl:   •  rosuvastatin (CRESTOR) 20 MG tablet, Take 1 tablet by mouth Daily. [facility], Disp: 90 tablet, Rfl: 0  •  varenicline (CHANTIX) 0.5 MG tablet, STEP 1: DAYS 1-3 TAKE 1 TABLET ONCE DAILY. DAYS 4-7 TAKE 1 TABLET TWICE DAILY., Disp: , Rfl:       History of Present Illness   Chris Aguirre is a 62 y.o. year old male here for follow up.  Is willing to again attempt smoking cessation with the use of Chantix.  He is scheduled to go to [facility] later this spring for work visit        OBJECTIVE:  Vitals:    02/23/23 1536   BP:  "130/68   BP Location: Right arm   Patient Position: Sitting   Pulse: 83   SpO2: 97%   Weight: 94.3 kg (208 lb)   Height: 182.9 cm (72.01\")     Body mass index is 28.2 kg/m².    Constitutional:       Appearance: Healthy appearance. Not in distress.   Neck:      Vascular: No JVR. JVD normal.   Pulmonary:      Effort: Pulmonary effort is normal.      Breath sounds: Normal breath sounds. No wheezing. No rhonchi. No rales.      Comments: Diminished diffusely  Chest:      Chest wall: Not tender to palpatation.   Cardiovascular:      PMI at left midclavicular line. Normal rate. Regular rhythm. Normal S1. Normal S2.      Murmurs: There is no murmur.      No gallop. No click. No rub.   Pulses:     Intact distal pulses.   Edema:     Peripheral edema absent.   Abdominal:      General: Bowel sounds are normal.      Palpations: Abdomen is soft.      Tenderness: There is no abdominal tenderness.   Musculoskeletal: Normal range of motion.         General: No tenderness. Skin:     General: Skin is warm and dry.   Neurological:      General: No focal deficit present.      Mental Status: Alert and oriented to person, place and time.         Diagnostic Data:  Procedures      ASSESSMENT:   Diagnosis Plan   1. Mixed hyperlipidemia  rosuvastatin (CRESTOR) 20 MG tablet    Lipid Panel          PLAN:  Hypertension controlled Lotrel refill    Mixed dyslipidemia follow-up lipid profile on rosuvastatin      Smoking cessation counseling greater than 10 minutes the office today and recommend cancer screening CT chest          Dick Robledo MD, FACC  "

## 2023-02-24 LAB
CHOLEST SERPL-MCNC: 134 MG/DL (ref 100–199)
HDLC SERPL-MCNC: 73 MG/DL
LDLC SERPL CALC-MCNC: 50 MG/DL (ref 0–99)
TRIGL SERPL-MCNC: 48 MG/DL (ref 0–149)
VLDLC SERPL CALC-MCNC: 11 MG/DL (ref 5–40)

## 2023-02-27 DIAGNOSIS — E78.2 MIXED HYPERLIPIDEMIA: ICD-10-CM

## 2023-02-27 RX ORDER — ROSUVASTATIN CALCIUM 20 MG/1
20 TABLET, COATED ORAL DAILY
Qty: 90 TABLET | Refills: 3 | Status: SHIPPED | OUTPATIENT
Start: 2023-02-27

## 2023-05-17 RX ORDER — AMLODIPINE BESYLATE AND BENAZEPRIL HYDROCHLORIDE 5; 10 MG/1; MG/1
1 CAPSULE ORAL DAILY
Qty: 90 CAPSULE | Refills: 2 | Status: SHIPPED | OUTPATIENT
Start: 2023-05-17

## 2024-02-21 RX ORDER — AMLODIPINE BESYLATE AND BENAZEPRIL HYDROCHLORIDE 5; 10 MG/1; MG/1
1 CAPSULE ORAL DAILY
Qty: 90 CAPSULE | Refills: 1 | Status: SHIPPED | OUTPATIENT
Start: 2024-02-21

## 2024-03-28 ENCOUNTER — OFFICE VISIT (OUTPATIENT)
Dept: CARDIOLOGY | Facility: CLINIC | Age: 64
End: 2024-03-28
Payer: COMMERCIAL

## 2024-03-28 VITALS
SYSTOLIC BLOOD PRESSURE: 126 MMHG | OXYGEN SATURATION: 94 % | BODY MASS INDEX: 27.9 KG/M2 | WEIGHT: 206 LBS | HEIGHT: 72 IN | HEART RATE: 82 BPM | DIASTOLIC BLOOD PRESSURE: 70 MMHG

## 2024-03-28 DIAGNOSIS — I10 PRIMARY HYPERTENSION: Primary | ICD-10-CM

## 2024-03-28 DIAGNOSIS — F17.200 SMOKER: ICD-10-CM

## 2024-03-28 DIAGNOSIS — E78.2 MIXED HYPERLIPIDEMIA: ICD-10-CM

## 2024-03-28 PROCEDURE — 99214 OFFICE O/P EST MOD 30 MIN: CPT | Performed by: INTERNAL MEDICINE

## 2024-03-28 RX ORDER — FLUTICASONE FUROATE AND VILANTEROL TRIFENATATE 200; 25 UG/1; UG/1
POWDER RESPIRATORY (INHALATION)
COMMUNITY
Start: 2024-01-08

## 2024-03-28 NOTE — PROGRESS NOTES
[facility] Cardiology  Office Progress Note  Chris Aguirre  1960 [address] [facility] [place] [address]       Visit Date: 03/28/24    PCP: Siva Darling MD  [address]   [facility] [place] [address]    IDENTIFICATION: A 63 [facility]. [facility] [place]    PROBLEM LIST:   Syncope- cough  CAD-aspirin anaphylactoid allergy  2011 stress test per Dr. Hickman, wnl, idb  2022 CT chest severe cor calcifications  HL  1/24/18  TG 59 HDL 60   2023 ldl 50  HTN  Nicotinism-chantix refractory  2/22 CT chest severe emphysema 5mm noncalcified nodule  Etoh abuse      CC:   Chief Complaint   Patient presents with    Hypertension       Allergies  Allergies   Allergen Reactions    Aspirin     Codeine        Current Medications    Current Outpatient Medications:     amLODIPine-benazepril (LOTREL 5-10) 5-10 MG per capsule, Take 1 capsule by mouth Daily., Disp: 90 capsule, Rfl: 1    Azelastine-Fluticasone 137-50 MCG/ACT suspension, INSTILL 1 SPRAY IN BOTH NOSTRILS TWICE A DAY, Disp: , Rfl:     Breo Ellipta 200-25 MCG/ACT inhaler, INHALE 1 PUFF BY MOUTH ONCE A DAY RINSE MOUTH AFTER USE, Disp: , Rfl:     Cetirizine HCl (ZYRTEC ALLERGY PO), Take  by mouth daily., Disp: , Rfl:     citalopram (CeleXA) 20 MG tablet, TAKE 1 TABLET BY MOUTH ONCE DAILY., Disp: 10 tablet, Rfl: 0    montelukast (SINGULAIR) 10 MG tablet, Take 1 tablet by mouth Every Night., Disp: , Rfl:     rosuvastatin (CRESTOR) 20 MG tablet, Take 1 tablet by mouth Daily. [facility], Disp: 90 tablet, Rfl: 3    varenicline (CHANTIX) 0.5 MG tablet, STEP 1: DAYS 1-3 TAKE 1 TABLET ONCE DAILY. DAYS 4-7 TAKE 1 TABLET TWICE DAILY., Disp: , Rfl:       History of Present Illness   Chris Aguirre is a 63 y.o. year old male here for follow up.  Lost his father this past year states he was having difficulty mentally and was not able to discontinue smoking.  He has been on only approximately 2 beers daily.  He states he  "has no chest symptom      OBJECTIVE:  Vitals:    03/28/24 1435   Pulse: 82   SpO2: 94%   Weight: 93.4 kg (206 lb)   Height: 182.9 cm (72\")       Body mass index is 27.94 kg/m².    Constitutional:       Appearance: Healthy appearance. Not in distress.   Neck:      Vascular: No JVR. JVD normal.   Pulmonary:      Effort: Pulmonary effort is normal.      Breath sounds: Normal breath sounds. No wheezing. No rhonchi. No rales.      Comments: Diminished diffusely  Chest:      Chest wall: Not tender to palpatation.   Cardiovascular:      PMI at left midclavicular line. Normal rate. Regular rhythm. Normal S1. Normal S2.       Murmurs: There is no murmur.      No gallop.  No click. No rub.   Pulses:     Intact distal pulses.   Edema:     Peripheral edema absent.   Abdominal:      General: Bowel sounds are normal.      Palpations: Abdomen is soft.      Tenderness: There is no abdominal tenderness.   Musculoskeletal: Normal range of motion.         General: No tenderness. Skin:     General: Skin is warm and dry.   Neurological:      General: No focal deficit present.      Mental Status: Alert and oriented to person, place and time.         Diagnostic Data:  Procedures      ASSESSMENT:   Diagnosis Plan   1. Primary hypertension        2. Mixed hyperlipidemia        3. Smoker              PLAN:  Hypertension controlled Lotrel refill    Mixed dyslipidemia f controlled on rosuvastatin      Smoking cessation counseling greater than 10 minutes the office today and recommend cancer screening CT chest          Dick Robledo MD, FACC  "

## 2024-05-24 DIAGNOSIS — E78.2 MIXED HYPERLIPIDEMIA: ICD-10-CM

## 2024-05-24 RX ORDER — ROSUVASTATIN CALCIUM 20 MG/1
20 TABLET, COATED ORAL DAILY
Qty: 30 TABLET | Refills: 0 | Status: SHIPPED | OUTPATIENT
Start: 2024-05-24

## 2024-08-21 DIAGNOSIS — E78.2 MIXED HYPERLIPIDEMIA: ICD-10-CM

## 2024-08-22 RX ORDER — AMLODIPINE BESYLATE AND BENAZEPRIL HYDROCHLORIDE 5; 10 MG/1; MG/1
1 CAPSULE ORAL DAILY
Qty: 90 CAPSULE | Refills: 1 | Status: SHIPPED | OUTPATIENT
Start: 2024-08-22

## 2024-08-22 RX ORDER — ROSUVASTATIN CALCIUM 20 MG/1
20 TABLET, COATED ORAL DAILY
Qty: 30 TABLET | Refills: 0 | Status: SHIPPED | OUTPATIENT
Start: 2024-08-22

## 2025-05-08 ENCOUNTER — OFFICE VISIT (OUTPATIENT)
Dept: CARDIOLOGY | Facility: CLINIC | Age: 65
End: 2025-05-08
Payer: COMMERCIAL

## 2025-05-08 VITALS
DIASTOLIC BLOOD PRESSURE: 70 MMHG | BODY MASS INDEX: 27.63 KG/M2 | HEART RATE: 83 BPM | OXYGEN SATURATION: 96 % | HEIGHT: 72 IN | SYSTOLIC BLOOD PRESSURE: 138 MMHG | WEIGHT: 204 LBS

## 2025-05-08 DIAGNOSIS — E78.2 MIXED HYPERLIPIDEMIA: ICD-10-CM

## 2025-05-08 DIAGNOSIS — F17.200 SMOKER: ICD-10-CM

## 2025-05-08 DIAGNOSIS — I25.10 CORONARY ARTERY DISEASE INVOLVING NATIVE CORONARY ARTERY OF NATIVE HEART WITHOUT ANGINA PECTORIS: ICD-10-CM

## 2025-05-08 DIAGNOSIS — I10 PRIMARY HYPERTENSION: Primary | ICD-10-CM

## 2025-05-08 PROCEDURE — 99214 OFFICE O/P EST MOD 30 MIN: CPT | Performed by: INTERNAL MEDICINE

## 2025-05-08 RX ORDER — ROSUVASTATIN CALCIUM 20 MG/1
20 TABLET, COATED ORAL DAILY
Qty: 90 TABLET | Refills: 3 | Status: SHIPPED | OUTPATIENT
Start: 2025-05-08

## 2025-05-08 NOTE — PROGRESS NOTES
"[facility] Cardiology  Office Progress Note  Chris Aguirre  1960 [address] [facility] [place] [address]       Visit Date: 05/08/25    PCP: Siva Darling MD  [address]   [facility] [address]    IDENTIFICATION: [facility] 64 [facility]. [facility]    PROBLEM LIST:   Syncope- cough  CAD-aspirin anaphylactoid allergy  2011 stress test per Dr. Hickman, wnl, idb  2022 CT chest severe cor calcifications  HL  1/24/18  TG 59 HDL 60   2023 ldl 50  HTN  Nicotinism-chantix refractory  2/22 CT chest severe emphysema 5mm noncalcified nodule  Etoh abuse      CC:   Chief Complaint   Patient presents with    Hypertension       Allergies  Allergies   Allergen Reactions    Aspirin     Codeine        Current Medications    Current Outpatient Medications:     amLODIPine-benazepril (LOTREL 5-10) 5-10 MG per capsule, Take 1 capsule by mouth Daily. NEEDS UPDATED LAB WORK PLS FOR FUTURE REFILLS, Disp: 90 capsule, Rfl: 1    Breo Ellipta 200-25 MCG/ACT inhaler, INHALE 1 PUFF BY MOUTH ONCE A DAY RINSE MOUTH AFTER USE, Disp: , Rfl:     Cetirizine HCl (ZYRTEC ALLERGY PO), Take  by mouth daily., Disp: , Rfl:     citalopram (CeleXA) 20 MG tablet, TAKE 1 TABLET BY MOUTH ONCE DAILY., Disp: 10 tablet, Rfl: 0    montelukast (SINGULAIR) 10 MG tablet, Take 1 tablet by mouth Every Night., Disp: , Rfl:       History of Present Illness   Chris Aguirre is a 64 y.o. year old male here for follow up.  He had run out of his rosuvastatin and has not had recent labs the staff would not refill this for him.  He is smoking less but down to 1 pack of cigarettes daily.  He notes no cardiac symptoms      OBJECTIVE:  Vitals:    05/08/25 1439   BP: 138/70   BP Location: Right arm   Patient Position: Sitting   Cuff Size: Adult   Pulse: 83   SpO2: 96%   Weight: 92.5 kg (204 lb)   Height: 182.9 cm (72\")         Body mass index is 27.67 kg/m².    Constitutional:       Appearance: Healthy appearance. Not in " distress.   Neck:      Vascular: No JVR. JVD normal.   Pulmonary:      Effort: Pulmonary effort is normal.      Breath sounds: Normal breath sounds. No wheezing. No rhonchi. No rales.      Comments: Diminished diffusely  Chest:      Chest wall: Not tender to palpatation.   Cardiovascular:      PMI at left midclavicular line. Normal rate. Regular rhythm. Normal S1. Normal S2.       Murmurs: There is no murmur.      No gallop.  No click. No rub.   Pulses:     Intact distal pulses.   Edema:     Peripheral edema absent.   Abdominal:      General: Bowel sounds are normal.      Palpations: Abdomen is soft.      Tenderness: There is no abdominal tenderness.   Musculoskeletal: Normal range of motion.         General: No tenderness. Skin:     General: Skin is warm and dry.   Neurological:      General: No focal deficit present.      Mental Status: Alert and oriented to person, place and time.         Diagnostic Data:  Procedures      ASSESSMENT:   Diagnosis Plan   1. Primary hypertension        2. Mixed hyperlipidemia        3. Smoker        4. Coronary artery disease involving native coronary artery of native heart without angina pectoris                PLAN:  Hypertension controlled Lotrel refill    Mixed dyslipidemia previously controlled on rosuvastatin will reorder      Smoking cessation counseling greater than 10 minutes the office today and recommend cancer screening CT chest          Dick Robledo MD, FACC   Ultrasound Used Text: Ultrasound depth is 1.1 mm. Was Ultrasound Performed Today?: No Is This Visit For Evaluation During Treatment Or Follow Up Post Treatment?: evaluation Evaluation Plan: The patient is undergoing superficial radiation therapy for skin cancer and presents for weekly evaluation and management.  Per\\nprotocol and as documented on the flow sheet, the patient was questioned as to subjective redness, pruritus, pain, drainage, fatigue, or any other symptoms. Objectively, the radiation area was evaluated with regards to erythema, atrophy, scale, crusting, erosion, ulceration, edema, purpura, tenderness, warmth, drainage, and any other findings. The plan was extensively reviewed including dose and dosing schedule. The simulation and clinical setup were also reviewed as were external and any internal shields and based on this review the appropriateness and sufficiency of treatment was determined. Assessment: Appropriate reaction Ultrasound Not Used Text: Ultrasound was not performed today due to Radiation Therapy Oncology Group (Rtog) Score: 2 Additional Comments (Add Customization Of Note Here): Patient had no concerns or questions today for last treatment.  Patient told Dr. Keating he has a full body check in September as there are some suspicious lesions in his scalp that he wants to be checked.